# Patient Record
(demographics unavailable — no encounter records)

---

## 2025-07-09 NOTE — REASON FOR VISIT
[Initial Evaluation] : an initial evaluation [DM Type 2] : DM Type 2 [Other: ______] : provided by [facility] [Interpreters_IDNumber] : [id number] [Interpreters_FullName] : Minerva [TWNoteComboBox1] : [place]

## 2025-07-09 NOTE — ASSESSMENT
[FreeTextEntry1] : Pt is a 58 y/o male with HTN, DM2.  Patient 26 minutes late to NPA and requires translation services so initial encounter limited.  Meds: As below plus Vit D supplement  Poorly controlled T2DM complicated by neuropathy DM diagnosis:  age 49 Last A1c: 13.4% Previous endocrinologist: none Home DM meds: Trulicity 1.5 weekly, Novolog 20 units TIDac, Farxiga 10mg daily. Did not tolerate metformin. Adherence: Misses doses occasionally. SMBG: BG fasting 220-240. Gets lows infrequently. Symptoms: No N/V/D/C -Switch trulicity for mounjaro. Start Mounjaro 2.5mg weekly x4 weeks then increase to 5 mg weekly if tolerating lower dose. Discussed risks/benefits/side effects/alternatives. No hx of pancreatitis, gallbladder disease, or personal or FHx of thyroid cancer. -Continue Farxiga 10mg daily -Stop Novolog. Start Lantus (or other covered basal insulin) 23 units qhs. -Patient to check FSBG twice daily in staggered manner and bring logs to next visit -Obtain A1c and CMP today -Obtain urine albumin-to-creatinine ratio to monitor moderately increased albuminuria -UTD with ophthalmologist for dilated eye exam -Referral to nutritionist -Placing dexcom G7 today. Discussed that there is some variability between Dexcom and FSBG readings (~20%) and to confirm any high or low BG value with FSBG or if feeling unwell. Also discussed that the Dexcom readings are measuring interstitial fluid, not blood, so readings are delayed ~15 minutes.   HTN -Goal BP <130/80. Continue Losartan 25mg daily.   HLD -Check lipid profile, hsCRP and LPa -Continue atorvastatin 10mg daily. Plan to increase next time. Patient will bring cardiac records from [place].  Thyroid nodule? On exam -Thyroid US  RTC 6 weeks.   Tahir Javed, DO

## 2025-07-09 NOTE — PHYSICAL EXAM
[Alert] : alert [Well Nourished] : well nourished [No Acute Distress] : no acute distress [EOMI] : extra ocular movement intact [No Proptosis] : no proptosis [No Lid Lag] : no lid lag [No Respiratory Distress] : no respiratory distress [No Accessory Muscle Use] : no accessory muscle use [Normal Rate and Effort] : normal respiratory rate and effort [Normal Rate] : heart rate was normal [Regular Rhythm] : with a regular rhythm [No Edema] : no peripheral edema [Not Tender] : non-tender [Soft] : abdomen soft [No Spinal Tenderness] : no spinal tenderness [Spine Straight] : spine straight [Cranial Nerves Intact] : cranial nerves 2-12 were intact [No Motor Deficits] : the motor exam was normal [Oriented x3] : oriented to person, place, and time [Normal Affect] : the affect was normal [Normal Mood] : the mood was normal [de-identified] : Firm RLP thyroid nodule, 1.5cm? Mobile.

## 2025-07-09 NOTE — HISTORY OF PRESENT ILLNESS
[FreeTextEntry1] : Pt is a 58 y/o male with HTN, DM2.  Patient 26 minutes late to NPA and requires translation services so initial encounter limited.  Meds: As below plus Vit D supplement  DM diagnosis:  age 49 Last A1c: 13.4% Previous endocrinologist: none Home DM meds: Trulicity 1.5 weekly, Novolog 20 units TIDac, Farxiga 10mg daily. Did not tolerate metformin. Adherence: Misses doses occasionally. SMBG: BG fasting 220-240. Gets lows infrequently. Symptoms: No N/V/D/C Diet at home: Eating 3 meals a day, doesn't follow any particular diet. Has juice/soda occasionally. Microvascular complications: Has nephropathy, no CKD, UTD with ophtho, has hx of retinopathy, has neuropathy Macrovascular complications: ?CAD ACEi/ARB: Losartan 25mg daily, metoprolol 50mg daily Statin: Atorvastatin 10 mg daily, ASA 81mg daily PMHx: As above. No hx of HF, pancreatitis, gallbladder disease, UTIs/yeast infections. No amputations or wounds. No DKA. FHx: Father with DM. No thyroid/bladder/pancreatic cancer. SHx: No tobacco or etoh use.